# Patient Record
Sex: MALE | Race: WHITE | Employment: FULL TIME | URBAN - METROPOLITAN AREA
[De-identification: names, ages, dates, MRNs, and addresses within clinical notes are randomized per-mention and may not be internally consistent; named-entity substitution may affect disease eponyms.]

---

## 2017-06-18 ENCOUNTER — HOSPITAL ENCOUNTER (OUTPATIENT)
Age: 52
Discharge: ANOTHER HOSPITAL | End: 2017-06-18
Attending: EMERGENCY MEDICINE
Payer: COMMERCIAL

## 2017-06-18 ENCOUNTER — HOSPITAL ENCOUNTER (OUTPATIENT)
Facility: HOSPITAL | Age: 52
Setting detail: OBSERVATION
Discharge: HOME | End: 2017-06-19
Attending: EMERGENCY MEDICINE | Admitting: HOSPITALIST
Payer: COMMERCIAL

## 2017-06-18 ENCOUNTER — APPOINTMENT (OUTPATIENT)
Dept: GENERAL RADIOLOGY | Facility: HOSPITAL | Age: 52
End: 2017-06-18
Attending: EMERGENCY MEDICINE
Payer: COMMERCIAL

## 2017-06-18 VITALS
RESPIRATION RATE: 18 BRPM | SYSTOLIC BLOOD PRESSURE: 121 MMHG | TEMPERATURE: 98 F | HEIGHT: 70 IN | BODY MASS INDEX: 26.48 KG/M2 | DIASTOLIC BLOOD PRESSURE: 79 MMHG | WEIGHT: 185 LBS | HEART RATE: 58 BPM | OXYGEN SATURATION: 100 %

## 2017-06-18 DIAGNOSIS — R07.9 CHEST PAIN, UNSPECIFIED TYPE: Primary | ICD-10-CM

## 2017-06-18 DIAGNOSIS — R07.9 CHEST PAIN OF UNCERTAIN ETIOLOGY: Primary | ICD-10-CM

## 2017-06-18 PROCEDURE — 80048 BASIC METABOLIC PNL TOTAL CA: CPT | Performed by: EMERGENCY MEDICINE

## 2017-06-18 PROCEDURE — 93010 ELECTROCARDIOGRAM REPORT: CPT | Performed by: EMERGENCY MEDICINE

## 2017-06-18 PROCEDURE — 99285 EMERGENCY DEPT VISIT HI MDM: CPT

## 2017-06-18 PROCEDURE — 36415 COLL VENOUS BLD VENIPUNCTURE: CPT

## 2017-06-18 PROCEDURE — 80061 LIPID PANEL: CPT | Performed by: HOSPITALIST

## 2017-06-18 PROCEDURE — 99205 OFFICE O/P NEW HI 60 MIN: CPT

## 2017-06-18 PROCEDURE — 84484 ASSAY OF TROPONIN QUANT: CPT | Performed by: EMERGENCY MEDICINE

## 2017-06-18 PROCEDURE — 93010 ELECTROCARDIOGRAM REPORT: CPT

## 2017-06-18 PROCEDURE — 93005 ELECTROCARDIOGRAM TRACING: CPT

## 2017-06-18 PROCEDURE — 85025 COMPLETE CBC W/AUTO DIFF WBC: CPT | Performed by: EMERGENCY MEDICINE

## 2017-06-18 PROCEDURE — 85379 FIBRIN DEGRADATION QUANT: CPT | Performed by: EMERGENCY MEDICINE

## 2017-06-18 PROCEDURE — 71020 XR CHEST PA + LAT CHEST (CPT=71020): CPT | Performed by: EMERGENCY MEDICINE

## 2017-06-18 RX ORDER — ASPIRIN 81 MG/1
324 TABLET, CHEWABLE ORAL ONCE
Status: COMPLETED | OUTPATIENT
Start: 2017-06-18 | End: 2017-06-18

## 2017-06-18 RX ORDER — ACETAMINOPHEN 325 MG/1
650 TABLET ORAL EVERY 6 HOURS PRN
Status: DISCONTINUED | OUTPATIENT
Start: 2017-06-18 | End: 2017-06-19

## 2017-06-18 RX ORDER — HEPARIN SODIUM 5000 [USP'U]/ML
5000 INJECTION, SOLUTION INTRAVENOUS; SUBCUTANEOUS EVERY 12 HOURS SCHEDULED
Status: DISCONTINUED | OUTPATIENT
Start: 2017-06-18 | End: 2017-06-19

## 2017-06-18 NOTE — ED PROVIDER NOTES
Patient Seen in: [facility]    History   Patient presents with:  Chest Pain    Stated Complaint: chest pain    HPI    The patient is a 57-year-old male without significant past medical history who presents with complaints pain  Other systems are as noted in HPI. Constitutional and vital signs reviewed. All other systems reviewed and negative except as noted above. PSFH elements reviewed from today and agreed except as otherwise stated in HPI.     Physical Exam

## 2017-06-18 NOTE — ED INITIAL ASSESSMENT (HPI)
Intermittent mid sternal chest pain for 2 weeks. [place] [employer] and  \"possibly strained\" his chest. Describes as tightness. No nausea. Denies diaphoresis but skin is clammy. SOB at times.  Numbness to left arm at [place]

## 2017-06-18 NOTE — ED PROVIDER NOTES
Patient Seen in: [facility] AND [facility] Emergency Department    History   Patient presents with:  Chest Pain Angina (cardiovascular)    Stated Complaint: Sent from [address] [place] [address]    66-year-old male without significant past medical history presents with complaints Systems    Positive for stated complaint: Sent from [employer] are as noted in HPI. Constitutional and vital signs reviewed. All other systems reviewed and negative except as noted above.     PSFH elements reviewed from today and agreed except The following orders were created for panel order CBC WITH DIFFERENTIAL WITH PLATELET.   Procedure                               Abnormality         Status                     ---------                               -----------         ------

## 2017-06-19 ENCOUNTER — APPOINTMENT (OUTPATIENT)
Dept: CV DIAGNOSTICS | Facility: HOSPITAL | Age: 52
End: 2017-06-19
Attending: HOSPITALIST
Payer: COMMERCIAL

## 2017-06-19 VITALS
HEIGHT: 70 IN | DIASTOLIC BLOOD PRESSURE: 84 MMHG | HEART RATE: 68 BPM | BODY MASS INDEX: 26.8 KG/M2 | SYSTOLIC BLOOD PRESSURE: 121 MMHG | TEMPERATURE: 98 F | WEIGHT: 187.19 LBS | OXYGEN SATURATION: 95 % | RESPIRATION RATE: 16 BRPM

## 2017-06-19 PROCEDURE — 93018 CV STRESS TEST I&R ONLY: CPT | Performed by: HOSPITALIST

## 2017-06-19 PROCEDURE — 80048 BASIC METABOLIC PNL TOTAL CA: CPT | Performed by: HOSPITALIST

## 2017-06-19 PROCEDURE — 93017 CV STRESS TEST TRACING ONLY: CPT | Performed by: NURSE PRACTITIONER

## 2017-06-19 PROCEDURE — 84443 ASSAY THYROID STIM HORMONE: CPT | Performed by: NURSE PRACTITIONER

## 2017-06-19 PROCEDURE — 93350 STRESS TTE ONLY: CPT | Performed by: NURSE PRACTITIONER

## 2017-06-19 PROCEDURE — 96372 THER/PROPH/DIAG INJ SC/IM: CPT

## 2017-06-19 PROCEDURE — 82607 VITAMIN B-12: CPT | Performed by: NURSE PRACTITIONER

## 2017-06-19 PROCEDURE — 84484 ASSAY OF TROPONIN QUANT: CPT | Performed by: NURSE PRACTITIONER

## 2017-06-19 PROCEDURE — 85025 COMPLETE CBC W/AUTO DIFF WBC: CPT | Performed by: HOSPITALIST

## 2017-06-19 PROCEDURE — 93016 CV STRESS TEST SUPVJ ONLY: CPT | Performed by: HOSPITALIST

## 2017-06-19 NOTE — DISCHARGE SUMMARY
[facility] Hospitalist Discharge Summary   Patient ID:  Magan Moya  [id number]  45year old  3/26/1965    Admit date: 6/18/2017  Discharge date: 6/19/2017  Risk of Readmission Lace+ Score: 25  59-90 High Risk  29-58 Medium Risk  0-28   Low Risk    Primary Car Def  -B12 543  -continue B12    EXAM:     GENERAL: no apparent distress  NEURO: A/A Ox3  RESP: non labored, CTA  CARDIO: Regular, no murmur  ABD: soft, NT, ND, BS+  EXTREMITIES: no edema, no calf tenderness, SCD in place    Operative Procedures:    Radiolo

## 2017-06-19 NOTE — H&P
[facility] Hospitalist Team  History and Physical     ASSESSMENT / PLAN:   46 yo male with hx insomnia- no off meds, cervical disc fx- C6-C7 with intermittent numbness to digits 1-3 on right had who presents with CP.  Cardiac enzymes and EKG negative, plans for is active person and has been sailing on [facility] since coming back     OBJECTIVE:  /75 mmHg  Pulse 64  Temp(Src) 97.8 °F (36.6 °C) (Oral)  Resp 16  Ht 177.8 cm (5' 10\")  Wt 187 lb 3.2 oz (84.913 kg)  BMI 26.86 kg/m2  SpO2 96%    GENERAL: no appar 5. 9   HGB  15.8  14.9   MCV  94.3  94.4   PLT  243  228       Recent Labs   Lab  06/18/17   1758  06/19/17   0537   NA  138  139   K  4.4  4.4   CL  104  106   CO2  25  26   BUN  17  14   CREATSERUM  0.86  0.92   GLU  100*  96   CA  8.6  8.5       No resul 5/5 in all extremities  Neuro: Grossly normal, CN intact, sensory intact  Psych: Affect- normal  SKIN: warm, dry  EXT: no edema    Assessment/Plan    Chest Pain, atypical  - possibly MSK, less likely ACS  - negative troponin; EKG without ST elevation/depre

## 2017-06-19 NOTE — CONSULTS
[facility] Cardiology  Report of Consultation    [facility] [place] [facility] Status:  Observation    3/26/1965 MRN [id number]   Location Idalia Malloy Attending Oumar Brady MD   Hosp Day # 1 PCP Kaylin Solo MD     Date of Admission:  2017   Date Facility-Administered Medications:  Heparin Sodium (Porcine) 5000 UNIT/ML injection 5,000 Units 5,000 Units Subcutaneous 2 times per day   acetaminophen (TYLENOL) tab 650 mg 650 mg Oral Q6H PRN       Allergies:  No Known Allergies      EXAM:   Patient Samantha 138  139   K  4.4  4.4   CL  104  106   CO2  25  26     Recent Labs   Lab  06/18/17   1758  06/18/17   1959  06/19/17   0750   TROP  0.01  0.01  0.01     Recent Labs   Lab  06/19/17   0537   RBC  4.46*   HGB  14.9   HCT  42.1   MCV  94.4   MCH  33.4*   MCH

## 2017-06-19 NOTE — PROGRESS NOTES
Patient denies any chest pain at this present time. Okay to discharge home per APN. Vitals stable at this present time. Discussed discharge instructions, no prescriptions given. D/C IV access and returned monitor to tele.

## 2019-09-26 PROBLEM — R07.9 CHEST PAIN: Status: RESOLVED | Noted: 2017-06-18 | Resolved: 2019-09-26

## 2019-10-07 PROCEDURE — 86803 HEPATITIS C AB TEST: CPT | Performed by: INTERNAL MEDICINE

## 2021-01-15 PROCEDURE — 88305 TISSUE EXAM BY PATHOLOGIST: CPT | Performed by: INTERNAL MEDICINE

## (undated) NOTE — IP AVS SNAPSHOT
[address]  [address] [facility] ~ [phone]                Discharge Summary   6/18/2017    Margie Varela           Admission Information        Provider Department    6/18/2017 Keyonna Belle MD [facility] 5[facility] Immunization History as of 6/19/2017  Never Reviewed    INFLUENZA  Deferred (Patient Refused)    TD 4/5/2004    TDAP 12/10/2015      Recent Hematology Lab Results  (Last 3 results in the past 90 days)    WBC RBC Hemoglobin Hematocrit MCV MCH MCHC RDW Pl you to explore options for quitting.     - If you have concerns related to behavioral health issues or thoughts of harming yourself, contact [facility] at [phone].     - If you don’t have insurance, Eusebia Kendrick Cyanocobalamin (VITAMIN B 12 OR)       Use: Increase blood cell counts   Most common side effects: Pain, fever, rash, fatigue, joint pain, blood clots, high blood pressure   What to report to your healthcare team: Pain, swelling, rash, fever